# Patient Record
Sex: MALE | Race: ASIAN | NOT HISPANIC OR LATINO | Employment: FULL TIME | URBAN - METROPOLITAN AREA
[De-identification: names, ages, dates, MRNs, and addresses within clinical notes are randomized per-mention and may not be internally consistent; named-entity substitution may affect disease eponyms.]

---

## 2023-08-25 ENCOUNTER — OFFICE VISIT (OUTPATIENT)
Dept: PRIMARY CARE | Facility: CLINIC | Age: 63
End: 2023-08-25
Payer: COMMERCIAL

## 2023-08-25 ENCOUNTER — LAB (OUTPATIENT)
Dept: LAB | Facility: LAB | Age: 63
End: 2023-08-25
Payer: COMMERCIAL

## 2023-08-25 VITALS
HEIGHT: 62 IN | DIASTOLIC BLOOD PRESSURE: 68 MMHG | OXYGEN SATURATION: 99 % | HEART RATE: 59 BPM | SYSTOLIC BLOOD PRESSURE: 108 MMHG | BODY MASS INDEX: 24.22 KG/M2 | WEIGHT: 131.6 LBS | TEMPERATURE: 97.6 F

## 2023-08-25 DIAGNOSIS — R97.20 INCREASED PROSTATE SPECIFIC ANTIGEN (PSA) VELOCITY: Primary | ICD-10-CM

## 2023-08-25 DIAGNOSIS — Z12.11 ENCOUNTER FOR SCREENING FOR MALIGNANT NEOPLASM OF COLON: ICD-10-CM

## 2023-08-25 DIAGNOSIS — Z00.00 ROUTINE GENERAL MEDICAL EXAMINATION AT A HEALTH CARE FACILITY: ICD-10-CM

## 2023-08-25 DIAGNOSIS — Z00.00 ROUTINE GENERAL MEDICAL EXAMINATION AT A HEALTH CARE FACILITY: Primary | ICD-10-CM

## 2023-08-25 LAB
ALANINE AMINOTRANSFERASE (SGPT) (U/L) IN SER/PLAS: 25 U/L (ref 10–52)
ANION GAP IN SER/PLAS: 9 MMOL/L (ref 10–20)
CALCIUM (MG/DL) IN SER/PLAS: 9.5 MG/DL (ref 8.6–10.3)
CARBON DIOXIDE, TOTAL (MMOL/L) IN SER/PLAS: 30 MMOL/L (ref 21–32)
CHLORIDE (MMOL/L) IN SER/PLAS: 107 MMOL/L (ref 98–107)
CHOLESTEROL (MG/DL) IN SER/PLAS: 115 MG/DL (ref 0–199)
CHOLESTEROL IN HDL (MG/DL) IN SER/PLAS: 32.7 MG/DL
CHOLESTEROL/HDL RATIO: 3.5
CREATININE (MG/DL) IN SER/PLAS: 0.9 MG/DL (ref 0.5–1.3)
ERYTHROCYTE DISTRIBUTION WIDTH (RATIO) BY AUTOMATED COUNT: 13.4 % (ref 11.5–14.5)
ERYTHROCYTE MEAN CORPUSCULAR HEMOGLOBIN CONCENTRATION (G/DL) BY AUTOMATED: 33.5 G/DL (ref 32–36)
ERYTHROCYTE MEAN CORPUSCULAR VOLUME (FL) BY AUTOMATED COUNT: 86 FL (ref 80–100)
ERYTHROCYTES (10*6/UL) IN BLOOD BY AUTOMATED COUNT: 5.16 X10E12/L (ref 4.5–5.9)
GFR MALE: >90 ML/MIN/1.73M2
GLUCOSE (MG/DL) IN SER/PLAS: 82 MG/DL (ref 74–99)
HEMATOCRIT (%) IN BLOOD BY AUTOMATED COUNT: 44.2 % (ref 41–52)
HEMOGLOBIN (G/DL) IN BLOOD: 14.8 G/DL (ref 13.5–17.5)
LDL: 63 MG/DL (ref 0–99)
LEUKOCYTES (10*3/UL) IN BLOOD BY AUTOMATED COUNT: 5.5 X10E9/L (ref 4.4–11.3)
PLATELETS (10*3/UL) IN BLOOD AUTOMATED COUNT: 257 X10E9/L (ref 150–450)
POTASSIUM (MMOL/L) IN SER/PLAS: 4.6 MMOL/L (ref 3.5–5.3)
PROSTATE SPECIFIC AG (NG/ML) IN SER/PLAS: 2.99 NG/ML (ref 0–4)
SODIUM (MMOL/L) IN SER/PLAS: 141 MMOL/L (ref 136–145)
TRIGLYCERIDE (MG/DL) IN SER/PLAS: 98 MG/DL (ref 0–149)
UREA NITROGEN (MG/DL) IN SER/PLAS: 15 MG/DL (ref 6–23)
VLDL: 20 MG/DL (ref 0–40)

## 2023-08-25 PROCEDURE — 83036 HEMOGLOBIN GLYCOSYLATED A1C: CPT

## 2023-08-25 PROCEDURE — 90715 TDAP VACCINE 7 YRS/> IM: CPT | Performed by: INTERNAL MEDICINE

## 2023-08-25 PROCEDURE — 84460 ALANINE AMINO (ALT) (SGPT): CPT

## 2023-08-25 PROCEDURE — 80061 LIPID PANEL: CPT

## 2023-08-25 PROCEDURE — 99396 PREV VISIT EST AGE 40-64: CPT | Performed by: INTERNAL MEDICINE

## 2023-08-25 PROCEDURE — 1036F TOBACCO NON-USER: CPT | Performed by: INTERNAL MEDICINE

## 2023-08-25 PROCEDURE — 84153 ASSAY OF PSA TOTAL: CPT

## 2023-08-25 PROCEDURE — 85027 COMPLETE CBC AUTOMATED: CPT

## 2023-08-25 PROCEDURE — 36415 COLL VENOUS BLD VENIPUNCTURE: CPT

## 2023-08-25 PROCEDURE — 90471 IMMUNIZATION ADMIN: CPT | Performed by: INTERNAL MEDICINE

## 2023-08-25 PROCEDURE — 80048 BASIC METABOLIC PNL TOTAL CA: CPT

## 2023-08-25 RX ORDER — ATORVASTATIN CALCIUM 40 MG/1
TABLET, FILM COATED ORAL
COMMUNITY
End: 2023-10-02

## 2023-08-25 RX ORDER — VIT C/E/ZN/COPPR/LUTEIN/ZEAXAN 250MG-90MG
1 CAPSULE ORAL DAILY
COMMUNITY

## 2023-08-25 RX ORDER — METOPROLOL SUCCINATE 50 MG/1
25 TABLET, EXTENDED RELEASE ORAL DAILY
COMMUNITY
End: 2023-10-05

## 2023-08-25 RX ORDER — ASPIRIN 81 MG/1
1 TABLET ORAL DAILY
COMMUNITY

## 2023-08-25 RX ORDER — VALSARTAN 40 MG/1
20 TABLET ORAL DAILY
COMMUNITY
End: 2023-09-18

## 2023-08-25 ASSESSMENT — PATIENT HEALTH QUESTIONNAIRE - PHQ9
SUM OF ALL RESPONSES TO PHQ9 QUESTIONS 1 AND 2: 0
1. LITTLE INTEREST OR PLEASURE IN DOING THINGS: NOT AT ALL
2. FEELING DOWN, DEPRESSED OR HOPELESS: NOT AT ALL

## 2023-08-25 ASSESSMENT — ENCOUNTER SYMPTOMS
OCCASIONAL FEELINGS OF UNSTEADINESS: 0
LOSS OF SENSATION IN FEET: 0
DEPRESSION: 0

## 2023-08-25 NOTE — PROGRESS NOTES
"SUBJECTIVE:   Gui Bell is a 62 y.o. male presenting for his annual checkup.  Current Outpatient Medications   Medication Sig Dispense Refill    aspirin 81 mg EC tablet Take 1 tablet (81 mg) by mouth once daily.      atorvastatin (Lipitor) 40 mg tablet TAKE 1 TABLET BY MOUTH EVERY DAY. NEEDS APPT FOR RFS      cholecalciferol (Vitamin D-3) 25 MCG (1000 UT) capsule Take 1 capsule (25 mcg) by mouth once daily.      metoprolol succinate XL (Toprol-XL) 50 mg 24 hr tablet Take 0.5 tablets (25 mg) by mouth once daily.      valsartan (Diovan) 40 mg tablet Take 0.5 tablets (20 mg) by mouth once daily.       No current facility-administered medications for this visit.     Allergies: Lisinopril     ROS:  Feeling well. No dyspnea or chest pain on exertion. No abdominal pain, change in bowel habits, black or bloody stools. No urinary tract or prostatic symptoms. No neurological complaints.    OBJECTIVE:   The patient appears well, alert, oriented x 3, in no distress.   /68 (BP Location: Left arm, Patient Position: Sitting, BP Cuff Size: Adult)   Pulse 59   Temp 36.4 °C (97.6 °F) (Skin)   Ht 1.562 m (5' 1.5\")   Wt 59.7 kg (131 lb 9.6 oz)   SpO2 99%   BMI 24.46 kg/m²   ENT normal.  Neck supple. No adenopathy or thyromegaly. NENO. Lungs are clear, good air entry, no wheezes, rhonchi or rales. S1 and S2 normal, no murmurs, regular rate and rhythm. Abdomen is soft without tenderness, guarding, mass or organomegaly.  exam: no penile lesions or discharge, no testicular masses or tenderness, no hernias.  Extremities show no edema, normal peripheral pulses. Neurological is normal without focal findings.    ASSESSMENT:   healthy adult male    PLAN:   continue current healthy lifestyle patterns and return for routine annual checkups   #1 CAD-clinically stable. Follow-up with cardiology, we will consult given cardiologist per pt request  #2 history of colon polypsâ€“overdue for colonoscopy. Order entered. Stressed " importance  #3 lipids- good LDL, HDL remains low. will d/w cards.   #4 h/o increased ast/alt- normal on last several test. Continue to follow  #5 h/o chronic pancreatitis-status post extensive workup with gastroenterologyâ€“2013. No further follow-up recommended. No signs or symptoms  #6 vit D def- stable  #7 question of mild cognitive impairment- stable. follow.      rec Shingrix vaccine  rec COVID booster, flu vaccine in the fall  TDAP today--> reviewed

## 2023-08-26 LAB
ESTIMATED AVERAGE GLUCOSE FOR HBA1C: 114 MG/DL
HEMOGLOBIN A1C/HEMOGLOBIN TOTAL IN BLOOD: 5.6 %

## 2023-09-17 DIAGNOSIS — I10 ESSENTIAL (PRIMARY) HYPERTENSION: ICD-10-CM

## 2023-09-18 RX ORDER — VALSARTAN 40 MG/1
20 TABLET ORAL DAILY
Qty: 45 TABLET | Refills: 3 | Status: SHIPPED | OUTPATIENT
Start: 2023-09-18

## 2023-09-30 DIAGNOSIS — E78.5 HYPERLIPIDEMIA, UNSPECIFIED: ICD-10-CM

## 2023-10-02 RX ORDER — ATORVASTATIN CALCIUM 40 MG/1
TABLET, FILM COATED ORAL
Qty: 90 TABLET | Refills: 3 | Status: SHIPPED | OUTPATIENT
Start: 2023-10-02

## 2024-03-29 ENCOUNTER — APPOINTMENT (OUTPATIENT)
Dept: PRIMARY CARE | Facility: CLINIC | Age: 64
End: 2024-03-29
Payer: COMMERCIAL

## 2024-08-05 ENCOUNTER — APPOINTMENT (OUTPATIENT)
Dept: PRIMARY CARE | Facility: CLINIC | Age: 64
End: 2024-08-05
Payer: COMMERCIAL

## 2024-08-05 ENCOUNTER — LAB (OUTPATIENT)
Dept: LAB | Facility: LAB | Age: 64
End: 2024-08-05
Payer: COMMERCIAL

## 2024-08-05 VITALS
WEIGHT: 132.6 LBS | BODY MASS INDEX: 24.65 KG/M2 | TEMPERATURE: 97.4 F | SYSTOLIC BLOOD PRESSURE: 118 MMHG | DIASTOLIC BLOOD PRESSURE: 72 MMHG

## 2024-08-05 DIAGNOSIS — I10 PRIMARY HYPERTENSION: ICD-10-CM

## 2024-08-05 DIAGNOSIS — E78.5 DYSLIPIDEMIA: ICD-10-CM

## 2024-08-05 DIAGNOSIS — Z00.00 WELL ADULT EXAM: ICD-10-CM

## 2024-08-05 DIAGNOSIS — H91.20 SUDDEN-ONSET SENSORINEURAL HEARING LOSS: Primary | ICD-10-CM

## 2024-08-05 DIAGNOSIS — I50.42 CHRONIC COMBINED SYSTOLIC AND DIASTOLIC HEART FAILURE (MULTI): ICD-10-CM

## 2024-08-05 DIAGNOSIS — R97.20 INCREASED PROSTATE SPECIFIC ANTIGEN (PSA) VELOCITY: ICD-10-CM

## 2024-08-05 DIAGNOSIS — I25.10 ARTERIOSCLEROTIC HEART DISEASE (ASHD): ICD-10-CM

## 2024-08-05 PROBLEM — I25.5 CARDIOMYOPATHY, ISCHEMIC: Status: ACTIVE | Noted: 2024-08-05

## 2024-08-05 PROBLEM — I25.2 HISTORY OF MYOCARDIAL INFARCTION: Status: ACTIVE | Noted: 2024-08-05

## 2024-08-05 LAB
ALT SERPL W P-5'-P-CCNC: 19 U/L (ref 10–52)
ANION GAP SERPL CALC-SCNC: 10 MMOL/L (ref 10–20)
BUN SERPL-MCNC: 18 MG/DL (ref 6–23)
CALCIUM SERPL-MCNC: 9.3 MG/DL (ref 8.6–10.3)
CHLORIDE SERPL-SCNC: 106 MMOL/L (ref 98–107)
CHOLEST SERPL-MCNC: 124 MG/DL (ref 0–199)
CHOLESTEROL/HDL RATIO: 3.3
CO2 SERPL-SCNC: 29 MMOL/L (ref 21–32)
CREAT SERPL-MCNC: 0.89 MG/DL (ref 0.5–1.3)
EGFRCR SERPLBLD CKD-EPI 2021: >90 ML/MIN/1.73M*2
ERYTHROCYTE [DISTWIDTH] IN BLOOD BY AUTOMATED COUNT: 13.6 % (ref 11.5–14.5)
EST. AVERAGE GLUCOSE BLD GHB EST-MCNC: 120 MG/DL
GLUCOSE SERPL-MCNC: 86 MG/DL (ref 74–99)
HBA1C MFR BLD: 5.8 %
HCT VFR BLD AUTO: 44.8 % (ref 41–52)
HDLC SERPL-MCNC: 37.6 MG/DL
HGB BLD-MCNC: 14.8 G/DL (ref 13.5–17.5)
LDLC SERPL CALC-MCNC: 73 MG/DL
MCH RBC QN AUTO: 28.7 PG (ref 26–34)
MCHC RBC AUTO-ENTMCNC: 33 G/DL (ref 32–36)
MCV RBC AUTO: 87 FL (ref 80–100)
NON HDL CHOLESTEROL: 86 MG/DL (ref 0–149)
NRBC BLD-RTO: 0 /100 WBCS (ref 0–0)
PLATELET # BLD AUTO: 263 X10*3/UL (ref 150–450)
POTASSIUM SERPL-SCNC: 4.5 MMOL/L (ref 3.5–5.3)
PSA SERPL-MCNC: 3.25 NG/ML
RBC # BLD AUTO: 5.16 X10*6/UL (ref 4.5–5.9)
SODIUM SERPL-SCNC: 140 MMOL/L (ref 136–145)
TRIGL SERPL-MCNC: 69 MG/DL (ref 0–149)
VLDL: 14 MG/DL (ref 0–40)
WBC # BLD AUTO: 5.5 X10*3/UL (ref 4.4–11.3)

## 2024-08-05 PROCEDURE — 83036 HEMOGLOBIN GLYCOSYLATED A1C: CPT

## 2024-08-05 PROCEDURE — 80061 LIPID PANEL: CPT

## 2024-08-05 PROCEDURE — 84153 ASSAY OF PSA TOTAL: CPT

## 2024-08-05 PROCEDURE — 80048 BASIC METABOLIC PNL TOTAL CA: CPT

## 2024-08-05 PROCEDURE — 1036F TOBACCO NON-USER: CPT | Performed by: INTERNAL MEDICINE

## 2024-08-05 PROCEDURE — 99214 OFFICE O/P EST MOD 30 MIN: CPT | Performed by: INTERNAL MEDICINE

## 2024-08-05 PROCEDURE — 3074F SYST BP LT 130 MM HG: CPT | Performed by: INTERNAL MEDICINE

## 2024-08-05 PROCEDURE — 3078F DIAST BP <80 MM HG: CPT | Performed by: INTERNAL MEDICINE

## 2024-08-05 PROCEDURE — 85027 COMPLETE CBC AUTOMATED: CPT

## 2024-08-05 PROCEDURE — 36415 COLL VENOUS BLD VENIPUNCTURE: CPT

## 2024-08-05 PROCEDURE — 84460 ALANINE AMINO (ALT) (SGPT): CPT

## 2024-08-05 RX ORDER — LANOLIN ALCOHOL/MO/W.PET/CERES
1000 CREAM (GRAM) TOPICAL DAILY
COMMUNITY

## 2024-08-05 RX ORDER — SAME BUTANEDISULFONATE/BETAINE 400-600 MG
POWDER IN PACKET (EA) ORAL DAILY
COMMUNITY

## 2024-08-05 RX ORDER — GLUCOSAM/CHONDRO/HERB 149/HYAL 750-100 MG
TABLET ORAL
COMMUNITY

## 2024-08-05 RX ORDER — PREDNISONE 20 MG/1
60 TABLET ORAL DAILY
Qty: 18 TABLET | Refills: 0 | Status: SHIPPED | OUTPATIENT
Start: 2024-08-05 | End: 2024-08-11

## 2024-08-05 RX ORDER — CALCITONIN SALMON 200 [IU]/.09ML
1 SPRAY, METERED NASAL DAILY
COMMUNITY

## 2024-08-05 NOTE — PROGRESS NOTES
Subjective   Patient ID: Gui Bell is a 63 y.o. male who presents for check left ear, muffled, off balance, humming noise x 10 dya and Numbness (In hands x while).    HPI     Left ear fullness, muffled hearing.  No pain.  No ST, congestion. O/w no upper resp c/o    Bl hand numbness.  Episodic.  Better w/ changing position.  Increased w/ bending arms,.  No weakness.  X ~1 year  Review of Systems    Objective   /72   Temp 36.3 °C (97.4 °F)   Wt 60.1 kg (132 lb 9.6 oz)   BMI 24.65 kg/m²     Physical Exam      Lab Results   Component Value Date    WBC 5.5 08/25/2023    HGB 14.8 08/25/2023    HCT 44.2 08/25/2023     08/25/2023    CHOL 115 08/25/2023    TRIG 98 08/25/2023    HDL 32.7 (A) 08/25/2023    ALT 25 08/25/2023    AST 25 08/05/2022     08/25/2023    K 4.6 08/25/2023     08/25/2023    CREATININE 0.90 08/25/2023    BUN 15 08/25/2023    CO2 30 08/25/2023    TSH 3.87 08/05/2022    PSA 2.99 08/25/2023    HGBA1C 5.6 08/25/2023       Assessment/Plan         #1 CAD-clinically stable. Follow-up with cardiology, we will consult given cardiologist per pt request  #2 history of colon polyps-remains overdue for colonoscopy.  Spent time reviewing importance of follow-up.  He voiced understanding but at this point declines.  Does have information to make appointment.  Reviewed risk of polyps and cancer.    #3 lipids- labs  #4 h/o increased ast/alt- normal on last several test. Continue to follow  #5 h/o chronic pancreatitis-status post extensive workup with gastroenterology- 2013. No further follow-up recommended. No signs or symptoms  #6 vit D def- stable  #7 question of mild cognitive impairment- stable. follow.   #8?  Sudden sensorineural hearing loss.  Reviewed.  Normal exam.  Prednisone.  Reviewed risk.  Consult ENT.  #9?  CTS-reviewed.  Normal exam.  No loss of strength.  Patient declines further evaluation at this point.  Follow-up.     rec Shingrix vaccine  rec COVID booster, flu  vaccine in the fall  TDAP today--> reviewed

## 2024-08-08 ENCOUNTER — CLINICAL SUPPORT (OUTPATIENT)
Dept: AUDIOLOGY | Facility: CLINIC | Age: 64
End: 2024-08-08
Payer: COMMERCIAL

## 2024-08-08 ENCOUNTER — OFFICE VISIT (OUTPATIENT)
Dept: OTOLARYNGOLOGY | Facility: CLINIC | Age: 64
End: 2024-08-08
Payer: COMMERCIAL

## 2024-08-08 VITALS — TEMPERATURE: 98.6 F | BODY MASS INDEX: 24.91 KG/M2 | WEIGHT: 134 LBS

## 2024-08-08 DIAGNOSIS — H91.20 SUDDEN-ONSET SENSORINEURAL HEARING LOSS: ICD-10-CM

## 2024-08-08 DIAGNOSIS — H90.3 ASYMMETRICAL SENSORINEURAL HEARING LOSS: Primary | ICD-10-CM

## 2024-08-08 PROCEDURE — 1036F TOBACCO NON-USER: CPT | Performed by: OTOLARYNGOLOGY

## 2024-08-08 PROCEDURE — 92557 COMPREHENSIVE HEARING TEST: CPT | Performed by: AUDIOLOGIST

## 2024-08-08 PROCEDURE — 69801 INCISE INNER EAR: CPT | Performed by: OTOLARYNGOLOGY

## 2024-08-08 PROCEDURE — 92567 TYMPANOMETRY: CPT | Performed by: AUDIOLOGIST

## 2024-08-08 PROCEDURE — 99204 OFFICE O/P NEW MOD 45 MIN: CPT | Performed by: OTOLARYNGOLOGY

## 2024-08-08 ASSESSMENT — PATIENT HEALTH QUESTIONNAIRE - PHQ9
SUM OF ALL RESPONSES TO PHQ9 QUESTIONS 1 AND 2: 0
SUM OF ALL RESPONSES TO PHQ9 QUESTIONS 1 AND 2: 0
2. FEELING DOWN, DEPRESSED OR HOPELESS: NOT AT ALL
1. LITTLE INTEREST OR PLEASURE IN DOING THINGS: NOT AT ALL
2. FEELING DOWN, DEPRESSED OR HOPELESS: NOT AT ALL
1. LITTLE INTEREST OR PLEASURE IN DOING THINGS: NOT AT ALL

## 2024-08-08 NOTE — PROGRESS NOTES
"AUDIOMETRIC EVALUATION       Name:  Gui Bell  :  1960  Age:  63 y.o.  Date of Evaluation:  2024     HISTORY  Gui Bell was seen today for a hearing evaluation due to sudden hearing loss in the left ear. Symptoms began with imbalance 10 days ago followed but hearing loss in the left ear. Now balance has improved and has a constant hissing in the left ear.  He began oral steroids about 3 days ago after consulting Dr. Benavides.    Denies aural pain, drainage, fullness, history of familial hearing loss or noise exposure.    PROCEDURE:  Otoscopic Evaluation:    RIGHT: Clear ear canal and tympanic membrane visualized.  LEFT:  Clear ear canal and tympanic membrane visualized.    Immittance: Tympanometry (226 Hz probe tone) and Stapedial Acoustic Reflexes Thresholds (ART)(Probe ear):  RIGHT: Normal middle ear pressure, mobility, and ear canal volume. Ipsilateral ART present 500-2000 Hz.  LEFT: Normal middle ear pressure, mobility, and ear canal volume. Ipsilateral ART absent 500-2000 Hz.    Pure Tone and Speech Audiometry:    Test Technique: Pure Tone Audiometry via insert earphones  Test Reliability: good    RIGHT: Normal hearing through 2000 Hz sloping to moderate  sensorineural hearing loss through 8000 Hz. Word Recognition score was excellent using recorded material (NU-6 10-word list ordered by difficulty).   LEFT: Normal hearing through 250 Hz sloping to profound  sensorineural hearing loss through 8000 Hz. Word Recognition score was no word recognition at equipment limits using recorded material (NU-6 25-word list).     EVALUATION  See scanned Audiogram in \"Media\".    IMPRESSIONS:  Today's test results indicate normal middle ear function, bilaterally. Essentially profound  sensorineural hearing loss in the left ear - Dr. Brown (ENT) has agreed see the patient as an add on today rather than patient needing to wait till next weeks appointment.    RECOMMENDATIONS:  Continue medical follow-up " with physician.  Return for audiologic assessment in conjunction with otologic care.   Communication strategies were discussed (utilizing visual cues/gestures; reducing background noise and distance from desired source; increasing light to assist with visual cues; use of clear speech).    PATIENT EDUCATION:   Discussed results and recommendations with Gui Bell.  Questions were addressed and the patient was encouraged to contact our department ([phone]) should concerns arise.    ANDIE Tate, CCC-A  Senior Clinical Audiologist    TIME: [phone]

## 2024-08-08 NOTE — PROGRESS NOTES
History of present illness:  Gui Bell is a 63 y.o. male presenting today for E/M of sudden left-sided hearing loss.  He is referred by Dr. Latonia Benavides.    The patient states that ten-days ago he was playing sports and noticed that he felt off balance. Since then he has noticed significantly less hearing on the left side. He was placed on a regimen of Prednisone 60 mg per day.     The patient's current medications, active allergies and list of medical problems were reviewed in the EHR and confirmed electronically there are as follows;    Allergies:   Allergies   Allergen Reactions    Lisinopril Rash     Problem list:  Patient Active Problem List   Diagnosis    Chronic combined systolic and diastolic heart failure (Multi)    Arteriosclerotic heart disease (ASHD)    Cardiomyopathy, ischemic    Dyslipidemia    History of myocardial infarction    Hypertension     Current list of medications:   Current Outpatient Medications   Medication Sig Dispense Refill    aspirin 81 mg EC tablet Take 1 tablet (81 mg) by mouth once daily.      atorvastatin (Lipitor) 40 mg tablet TAKE 1 TABLET BY MOUTH EVERY DAY. NEEDS APPT FOR RFS 90 tablet 3    calcitonin, salmon, (Miacalcin) 200 unit/actuation nasal spray Administer 1 spray into one nostril once daily.      cholecalciferol (Vitamin D-3) 25 MCG (1000 UT) capsule Take 1 capsule (25 mcg) by mouth once daily.      cholecalciferol, vitD3,/vit K2 (vitamin D3-vitamin K2) 125-90 mcg capsule Take by mouth early in the morning..      cyanocobalamin (Vitamin B-12) 1,000 mcg tablet Take 1 tablet (1,000 mcg) by mouth once daily.      dihydroberberine (BERBERINE ES-5 ORAL) Take 550 mg by mouth early in the morning..      metoprolol succinate XL (Toprol-XL) 50 mg 24 hr tablet TAKE 1/2 TABLET BY MOUTH EVERY DAY 45 tablet 3    omega 3-dha-epa-fish oil (Fish OiL) 1,000 mg (120 mg-180 mg) capsule Take by mouth.      omega-3 fatty acids/dha/epa (OVEGA-3 ORAL) Take 720 mg by mouth once daily.       predniSONE (Deltasone) 20 mg tablet Take 3 tablets (60 mg) by mouth once daily for 6 days. 18 tablet 0    valsartan (Diovan) 40 mg tablet TAKE 1/2 TABLET BY MOUTH EVERY DAY 45 tablet 3     No current facility-administered medications for this visit.     Medical history:  Past Medical History:   Diagnosis Date    Loose body in knee, unspecified knee 2021    Loose body in knee    Old myocardial infarction 2013    Past myocardial infarction    Pain in left knee 2021    Left knee pain    Pain in right knee 2021    Right knee pain    Pain in unspecified knee 2021    Chronic knee pain, unspecified laterality     Surgical history:  Past Surgical History:   Procedure Laterality Date    OTHER SURGICAL HISTORY  2021    Tonsillectomy    OTHER SURGICAL HISTORY  2021    Cardiac catheterization with stent placement     Family history:  Family History   Problem Relation Name Age of Onset    Hypertension Mother      Coronary artery disease Father      Other (cabg) Father      Heart attack Father      Other (acute myeloid leukemia) Brother       Social history:  Social History     Tobacco Use    Smoking status: Former     Current packs/day: 0.00     Average packs/day: 0.3 packs/day for 10.0 years (2.5 ttl pk-yrs)     Types: Cigarettes     Start date:      Quit date:      Years since quittin.6     Passive exposure: Past    Smokeless tobacco: Never   Substance Use Topics    Alcohol use: Yes     Comment: rare    Drug use: Never       Physical Examination:  CONSTITUTIONAL:  No acute distress  VOICE:  No hoarseness or other abnormality  RESPIRATION:  Breathing comfortably, no stridor  CV:  No clubbing/cyanosis/edema in hands  EYES:  EOM intact, sclera clear  NEURO:  Alert and oriented times 3, Cranial nerves II-XII grossly intact and symmetric bilaterally  HEAD AND FACE:  Symmetric facial features, no masses or lesions  RIGHT EAR:  Normal external ear and post auricular area, no  visible lesions, external auditory canal patent, tympanic membrane intact, no retraction, no signs of mass, effusion, or infection within the middle ear  LEFT EAR: Normal external ear and post auricular area, no visible lesions, external auditory canal patent, tympanic membrane intact, no retraction, no signs of mass, effusion, or infection within the middle ear  NOSE:  Anterior rhinoscopy clear, no significant external deformity.  ORAL CAVITY/OROPHARYNX/LIPS:  normal lining, mobile tongue.  PHARYNGEAL WALLS: Moist mucosal lining, good palatal elevation  NECK/LYMPH:  No LAD, no thyroid masses, trachea midline  SKIN:  Neck and facial skin is without scar or injury  PSYCH:  Alert and oriented with appropriate mood and affect    Procedure note:  Date: 8/8/2024    The indications and risks of intratympanic steroid injection were discussed.  The patient was positioned, an appropriately sized ear speculum was introduced and the binocular microscope was brought into the field. After application of phenol, dexamethasone was injected into the middle ear. Approximately 0.5 mL was injected. The patient was then left to rest in a supine position for 15 minutes. The procedure was well tolerated, there were no complications.       Diagnostic testing:  Audiometry:  Audiometry testing done reveals:  On the right: Normal sloping to moderate high frequency sensorineural hearing loss. Speech discrimination 100%.  On the left: Normal sloping to profound sensorineural hearing loss. Speech discrimination 0%.    I personally reviewed the available patient's external record and independently reviewed their audiometric testing [ through the appropriate viewing software as detailed in my note and agree with the detailed report.     Impression:  Asymmetric hearing loss  Sudden left-sided sensorineural hearing      Recommendation:  We will go ahead and order a MRI of the IAC with and without contrast to rule out retrocochlear pathology.  I did  ask him to keep his appointment with Dr. Montiel for possibly another injection next week.  Will see him back in few weeks with repeat audiometric testing.    I discussed with the patient the complexity of my medical decision making including the treatment and testing rational, indications of their elective procedure and possible adverse effects and/or complications. Based on the provided documentation and my professional assessment of this patient's new diagnosis, the complexity of evaluation and treatment is moderate.    This note was created using speech recognition transcription software. Despite proofreading, several typographical errors might be present that might affect the meaning of the content. Please call with any questions.    Scribe Attestation  By signing my name below, I, Gricel Orlando , Scrnelsy   attest that this documentation has been prepared under the direction and in the presence of Fernando Brown MD.

## 2024-08-16 ENCOUNTER — APPOINTMENT (OUTPATIENT)
Dept: OTOLARYNGOLOGY | Facility: CLINIC | Age: 64
End: 2024-08-16
Payer: COMMERCIAL

## 2024-08-21 ENCOUNTER — HOSPITAL ENCOUNTER (OUTPATIENT)
Dept: RADIOLOGY | Facility: CLINIC | Age: 64
Discharge: HOME | End: 2024-08-21
Payer: COMMERCIAL

## 2024-08-21 DIAGNOSIS — H91.20 SUDDEN-ONSET SENSORINEURAL HEARING LOSS: ICD-10-CM

## 2024-08-21 PROCEDURE — 70553 MRI BRAIN STEM W/O & W/DYE: CPT

## 2024-08-21 PROCEDURE — A9575 INJ GADOTERATE MEGLUMI 0.1ML: HCPCS | Performed by: OTOLARYNGOLOGY

## 2024-08-21 PROCEDURE — 2550000001 HC RX 255 CONTRASTS: Performed by: OTOLARYNGOLOGY

## 2024-08-21 PROCEDURE — 70553 MRI BRAIN STEM W/O & W/DYE: CPT | Performed by: RADIOLOGY

## 2024-08-21 RX ORDER — GADOTERATE MEGLUMINE 376.9 MG/ML
12 INJECTION INTRAVENOUS
Status: COMPLETED | OUTPATIENT
Start: 2024-08-21 | End: 2024-08-21

## 2024-08-22 ENCOUNTER — APPOINTMENT (OUTPATIENT)
Dept: OTOLARYNGOLOGY | Facility: CLINIC | Age: 64
End: 2024-08-22
Payer: COMMERCIAL

## 2024-08-22 ENCOUNTER — APPOINTMENT (OUTPATIENT)
Dept: RADIOLOGY | Facility: CLINIC | Age: 64
End: 2024-08-22
Payer: COMMERCIAL

## 2024-08-22 VITALS — HEIGHT: 62 IN | BODY MASS INDEX: 24.66 KG/M2 | WEIGHT: 134 LBS

## 2024-08-22 DIAGNOSIS — H93.12 TINNITUS OF LEFT EAR: ICD-10-CM

## 2024-08-22 DIAGNOSIS — H90.3 ASYMMETRICAL SENSORINEURAL HEARING LOSS: ICD-10-CM

## 2024-08-22 DIAGNOSIS — H91.20 SUDDEN-ONSET SENSORINEURAL HEARING LOSS: Primary | ICD-10-CM

## 2024-08-22 PROCEDURE — 69801 INCISE INNER EAR: CPT | Performed by: STUDENT IN AN ORGANIZED HEALTH CARE EDUCATION/TRAINING PROGRAM

## 2024-08-22 PROCEDURE — 99204 OFFICE O/P NEW MOD 45 MIN: CPT | Performed by: STUDENT IN AN ORGANIZED HEALTH CARE EDUCATION/TRAINING PROGRAM

## 2024-08-22 PROCEDURE — 1036F TOBACCO NON-USER: CPT | Performed by: STUDENT IN AN ORGANIZED HEALTH CARE EDUCATION/TRAINING PROGRAM

## 2024-08-22 PROCEDURE — 3008F BODY MASS INDEX DOCD: CPT | Performed by: STUDENT IN AN ORGANIZED HEALTH CARE EDUCATION/TRAINING PROGRAM

## 2024-08-22 NOTE — PROGRESS NOTES
PÉREZ Bell is a 63 y.o. male presenting for initial evaluation of sudden left hearing loss.  The patient reports developing left sudden hearing loss with associated dizziness on 7/28/24, he was evaluated by his PCP (Dr. Benavides) and prescribed systemic steroids (prednisone).  He was then evaluated with Dr. Brown who perform a left transtympanic dexamethasone injection.  The patient reports improvement but persistent left hearing loss and left high-frequency tinnitus which he describes as his hissing.  Dizziness/vertigo has resolved.  Denies ear pain, vertigo, discharge from the ears, aural fullness, autophony.  No history of previous ear surgeries.     Past Medical History:   Diagnosis Date    Ear problems     HL (hearing loss)     Loose body in knee, unspecified knee 04/30/2021    Loose body in knee    Old myocardial infarction 09/30/2013    Past myocardial infarction    Pain in left knee 04/12/2021    Left knee pain    Pain in right knee 04/12/2021    Right knee pain    Pain in unspecified knee 04/30/2021    Chronic knee pain, unspecified laterality    Sleep apnea        Past Surgical History:   Procedure Laterality Date    OTHER SURGICAL HISTORY  05/04/2021    Tonsillectomy    OTHER SURGICAL HISTORY  05/04/2021    Cardiac catheterization with stent placement         Current Outpatient Medications on File Prior to Visit   Medication Sig Dispense Refill    aspirin 81 mg EC tablet Take 1 tablet (81 mg) by mouth once daily.      atorvastatin (Lipitor) 40 mg tablet TAKE 1 TABLET BY MOUTH EVERY DAY. NEEDS APPT FOR RFS 90 tablet 3    cholecalciferol (Vitamin D-3) 25 MCG (1000 UT) capsule Take 1 capsule (25 mcg) by mouth once daily.      cholecalciferol, vitD3,/vit K2 (vitamin D3-vitamin K2) 125-90 mcg capsule Take by mouth early in the morning..      cyanocobalamin (Vitamin B-12) 1,000 mcg tablet Take 1 tablet (1,000 mcg) by mouth once daily.      dihydroberberine (BERBERINE ES-5 ORAL) Take 550 mg by mouth  early in the morning..      metoprolol succinate XL (Toprol-XL) 50 mg 24 hr tablet TAKE 1/2 TABLET BY MOUTH EVERY DAY 45 tablet 3    omega 3-dha-epa-fish oil (Fish OiL) 1,000 mg (120 mg-180 mg) capsule Take by mouth.      omega-3 fatty acids/dha/epa (OVEGA-3 ORAL) Take 720 mg by mouth once daily.      valsartan (Diovan) 40 mg tablet TAKE 1/2 TABLET BY MOUTH EVERY DAY 45 tablet 3    calcitonin, salmon, (Miacalcin) 200 unit/actuation nasal spray Administer 1 spray into one nostril once daily.       No current facility-administered medications on file prior to visit.        Allergies   Allergen Reactions    Lisinopril Rash        Review of Systems  A detailed 12 point ROS was performed and is negative except as noted in the intake form, HPI and/or Past Medical History        Physical Exam   CONSTITUTIONAL: Well developed, well nourished.  VOICE: Normal voice quality  RESPIRATION: Breathing comfortably, no stridor.  CV: No clubbing/cyanosis/edema in hands.  EYES: EOM Intact, sclera normal.  NEURO: Alert and oriented times 3, Cranial nerves V,VII intact and symmetric bilaterally.  HEAD AND FACE: Symmetric facial features, no masses or lesions, sinuses nontender to palpation.  SALIVARY GLANDS: Parotid and submandibular glands normal bilaterally.  EARS: Normal external ears, external auditory canals, and TMs to otoscopy  NOSE: External nose midline, anterior rhinoscopy is normal with limited visualization to the anterior aspect of the interior turbinates. No lesions noted.  ORAL CAVITY/OROPHARYNX/LIPS: Normal mucous membranes, normal floor of mouth/tongue/OP, no masses or lesions are noted.  PHARYNGEAL WALLS AND NASOPHARYNX: No masses noted. Mucosa appears clean and moist  NECK/LYMPH: No LAD, no thyroid masses. Trachea palpably midline  SKIN: Neck skin is without injury  PSYCH: Alert and oriented with appropriate mood and affect     Procedure:   Left transtympanic dexamethasone injection  Diagnosis/indication: Left sudden  sensorineural hearing loss  The procedure was reviewed and explained, multiple treatment alternatives, risks and benefits discussed.  Consent was obtained.  With the use of the microscope and speculum the left ear was examined, a transtympanic dexamethasone injection (10mg/ml) was performed with a 25-gauge spinal needle.  The patient tolerated the procedure well.     Results:   - MRI IAC 8/21/2024 personally reviewed showing no CPA/IAC lesions or masses.   - Audiogram 8/22/24 personally reviewed  Right: Normal hearing up to 3000 Hz sloping to moderate sensorineural hearing loss at 4000 Hz improving to mild sensorineural hearing loss at 8000 Hz.  Speech discrimination score 100%.  Type a tympanogram.  Left: Normal sloping to profound sensorineural hearing loss.  Speech discrimination score 0%.  Type a tympanogram.    Assessment  Left sudden sensorineural hearing loss  Asymmetric sensorineural hearing loss  Left tinnitus    Plan  63-year-old male presenting for evaluation of left sudden hearing loss s/p systemic steroids and middle ear dexamethasone injection (x1).  Reports slight improvement but persistent hearing loss, multiple treatment alternatives discussed, he elected to proceed with an additional left transtympanic dexamethasone injection which was performed today in clinic.  MRI IAC 8/21/2024: No CPA/IAC masses  RTC 1w

## 2024-08-23 ENCOUNTER — APPOINTMENT (OUTPATIENT)
Dept: OTOLARYNGOLOGY | Facility: CLINIC | Age: 64
End: 2024-08-23
Payer: COMMERCIAL

## 2024-08-26 ENCOUNTER — APPOINTMENT (OUTPATIENT)
Dept: PRIMARY CARE | Facility: CLINIC | Age: 64
End: 2024-08-26
Payer: COMMERCIAL

## 2024-08-26 VITALS
DIASTOLIC BLOOD PRESSURE: 64 MMHG | WEIGHT: 133 LBS | HEIGHT: 62 IN | SYSTOLIC BLOOD PRESSURE: 106 MMHG | BODY MASS INDEX: 24.48 KG/M2

## 2024-08-26 DIAGNOSIS — R73.01 IMPAIRED FASTING BLOOD SUGAR: ICD-10-CM

## 2024-08-26 DIAGNOSIS — Z12.11 ENCOUNTER FOR SCREENING FOR MALIGNANT NEOPLASM OF COLON: ICD-10-CM

## 2024-08-26 DIAGNOSIS — I63.9 CEREBROVASCULAR ACCIDENT (CVA), UNSPECIFIED MECHANISM (MULTI): ICD-10-CM

## 2024-08-26 DIAGNOSIS — Z00.00 REGULAR CHECK-UP: Primary | ICD-10-CM

## 2024-08-26 DIAGNOSIS — H91.20 SUDDEN-ONSET SENSORINEURAL HEARING LOSS: ICD-10-CM

## 2024-08-26 PROCEDURE — 99396 PREV VISIT EST AGE 40-64: CPT | Performed by: INTERNAL MEDICINE

## 2024-08-26 PROCEDURE — 3074F SYST BP LT 130 MM HG: CPT | Performed by: INTERNAL MEDICINE

## 2024-08-26 PROCEDURE — 3008F BODY MASS INDEX DOCD: CPT | Performed by: INTERNAL MEDICINE

## 2024-08-26 PROCEDURE — 3078F DIAST BP <80 MM HG: CPT | Performed by: INTERNAL MEDICINE

## 2024-08-26 ASSESSMENT — PATIENT HEALTH QUESTIONNAIRE - PHQ9
2. FEELING DOWN, DEPRESSED OR HOPELESS: NOT AT ALL
1. LITTLE INTEREST OR PLEASURE IN DOING THINGS: NOT AT ALL
SUM OF ALL RESPONSES TO PHQ9 QUESTIONS 1 AND 2: 0

## 2024-08-26 NOTE — PROGRESS NOTES
"SUBJECTIVE:   Gui Bell is a 63 y.o. male presenting for his annual checkup.  Current Outpatient Medications   Medication Sig Dispense Refill    aspirin 81 mg EC tablet Take 1 tablet (81 mg) by mouth once daily.      atorvastatin (Lipitor) 40 mg tablet TAKE 1 TABLET BY MOUTH EVERY DAY. NEEDS APPT FOR RFS 90 tablet 3    cholecalciferol (Vitamin D-3) 25 MCG (1000 UT) capsule Take 1 capsule (25 mcg) by mouth once daily.      cholecalciferol, vitD3,/vit K2 (vitamin D3-vitamin K2) 125-90 mcg capsule Take by mouth early in the morning..      cyanocobalamin (Vitamin B-12) 1,000 mcg tablet Take 1 tablet (1,000 mcg) by mouth once daily.      dihydroberberine (BERBERINE ES-5 ORAL) Take 550 mg by mouth early in the morning..      metoprolol succinate XL (Toprol-XL) 50 mg 24 hr tablet TAKE 1/2 TABLET BY MOUTH EVERY DAY 45 tablet 3    omega 3-dha-epa-fish oil (Fish OiL) 1,000 mg (120 mg-180 mg) capsule Take by mouth.      omega-3 fatty acids/dha/epa (OVEGA-3 ORAL) Take 720 mg by mouth once daily.      valsartan (Diovan) 40 mg tablet TAKE 1/2 TABLET BY MOUTH EVERY DAY 45 tablet 3    calcitonin, salmon, (Miacalcin) 200 unit/actuation nasal spray Administer 1 spray into one nostril once daily.       No current facility-administered medications for this visit.     Allergies: Lisinopril     ROS:  Feeling well. No dyspnea or chest pain on exertion. No abdominal pain, change in bowel habits, black or bloody stools. No urinary tract or prostatic symptoms. No neurological complaints.    OBJECTIVE:   The patient appears well, alert, oriented x 3, in no distress.   /64   Ht 1.562 m (5' 1.5\")   Wt 60.3 kg (133 lb)   BMI 24.72 kg/m²   ENT normal.  Neck supple. No adenopathy or thyromegaly. NENO. Lungs are clear, good air entry, no wheezes, rhonchi or rales. S1 and S2 normal, no murmurs, regular rate and rhythm. Abdomen is soft without tenderness, guarding, mass or organomegaly.   Extremities show no edema, normal " peripheral pulses. Neurological is normal without focal findings.      Lab Results   Component Value Date    WBC 5.5 08/05/2024    HGB 14.8 08/05/2024    HCT 44.8 08/05/2024     08/05/2024    CHOL 124 08/05/2024    TRIG 69 08/05/2024    HDL 37.6 08/05/2024    ALT 19 08/05/2024    AST 25 08/05/2022     08/05/2024    K 4.5 08/05/2024     08/05/2024    CREATININE 0.89 08/05/2024    BUN 18 08/05/2024    CO2 29 08/05/2024    TSH 3.87 08/05/2022    PSA 3.25 08/05/2024    HGBA1C 5.8 (H) 08/05/2024     === 08/21/24 ===    MR IAC WO AND W CONTRAST    - Impression -  1. There are 3 small old infarcts in the right cerebellum and minimal  scattered foci of T2 hyperintensity within the cerebral hemispheric  white matter which are nonspecific. Otherwise, unremarkable MRI of  the brain.    2. Unremarkable MRI of the internal auditory canals/inner ear  structures.    This study was interpreted at [facility].    MACRO:  None    Signed by: Scott Long 8/21/2024 3:28 PM  Dictation workstation:   [id number]    ASSESSMENT:   healthy adult male    PLAN:   return for routine annual checkups    #1 CAD-clinically stable. Follow-up with cardiology, reviewed importance of cardiology follow-up.  He will call and make appointment directly  #2 history of colon polyps-remains overdue for colonoscopy.  Spent time reviewing importance of follow-up.  He voiced understanding but at this point declines.  Does have information to make appointment.  Orders in EMR.  Reviewed risk of polyps and cancer.    #3 lipids-on target   #4 h/o increased ast/alt- normal on last several test. Continue to follow  #5 h/o chronic pancreatitis-status post extensive workup with gastroenterology- 2013. No further follow-up recommended. No signs or symptoms  #6 vit D def- stable  #7 question of mild cognitive impairment- stable. follow.   #8?  Sudden sensorineural hearing loss.  Reviewed.  Normal exam.   Follow-up  ENT.  #9?  CTS-reviewed.  Normal exam.  No loss of strength.  Patient declines further evaluation at this point.  Follow-up.  #10 stroke seen on MRI-remote.  Continue current treatment with aspirin/statin.  Consult neurology per patient request.     Recommend Shingrix vaccine  Recommend COVID booster.  Recommend flu vaccine in the fall.  rec COVID booster, flu vaccine in the fall  TDAP today--> reviewed

## 2024-08-30 ENCOUNTER — APPOINTMENT (OUTPATIENT)
Dept: OTOLARYNGOLOGY | Facility: CLINIC | Age: 64
End: 2024-08-30
Payer: COMMERCIAL

## 2024-08-30 VITALS — HEIGHT: 61 IN | WEIGHT: 134 LBS | BODY MASS INDEX: 25.3 KG/M2

## 2024-08-30 DIAGNOSIS — H93.12 TINNITUS OF LEFT EAR: ICD-10-CM

## 2024-08-30 DIAGNOSIS — H91.20 SUDDEN-ONSET SENSORINEURAL HEARING LOSS: Primary | ICD-10-CM

## 2024-08-30 DIAGNOSIS — H90.3 ASYMMETRICAL SENSORINEURAL HEARING LOSS: ICD-10-CM

## 2024-08-30 PROBLEM — R50.9 FEVER: Status: ACTIVE | Noted: 2024-08-30

## 2024-08-30 PROBLEM — R52 BODY ACHES: Status: ACTIVE | Noted: 2024-08-30

## 2024-08-30 PROBLEM — G47.33 OBSTRUCTIVE SLEEP APNEA: Status: ACTIVE | Noted: 2024-08-30

## 2024-08-30 PROBLEM — E55.9 VITAMIN D DEFICIENCY: Status: ACTIVE | Noted: 2024-08-30

## 2024-08-30 PROBLEM — L30.9 DERMATITIS: Status: ACTIVE | Noted: 2024-08-30

## 2024-08-30 PROBLEM — M17.0 PRIMARY OSTEOARTHRITIS OF BOTH KNEES: Status: ACTIVE | Noted: 2024-08-30

## 2024-08-30 PROBLEM — R97.20 ELEVATED PSA: Status: ACTIVE | Noted: 2024-08-30

## 2024-08-30 PROBLEM — H00.014 HORDEOLUM EXTERNUM OF LEFT UPPER EYELID: Status: ACTIVE | Noted: 2024-08-30

## 2024-08-30 PROBLEM — R74.01 ELEVATED TRANSAMINASE LEVEL: Status: ACTIVE | Noted: 2024-08-30

## 2024-08-30 PROCEDURE — 99213 OFFICE O/P EST LOW 20 MIN: CPT | Performed by: STUDENT IN AN ORGANIZED HEALTH CARE EDUCATION/TRAINING PROGRAM

## 2024-08-30 PROCEDURE — 69801 INCISE INNER EAR: CPT | Performed by: STUDENT IN AN ORGANIZED HEALTH CARE EDUCATION/TRAINING PROGRAM

## 2024-08-30 PROCEDURE — 3008F BODY MASS INDEX DOCD: CPT | Performed by: STUDENT IN AN ORGANIZED HEALTH CARE EDUCATION/TRAINING PROGRAM

## 2024-08-30 PROCEDURE — 1036F TOBACCO NON-USER: CPT | Performed by: STUDENT IN AN ORGANIZED HEALTH CARE EDUCATION/TRAINING PROGRAM

## 2024-08-30 NOTE — PROGRESS NOTES
HPI  8/30/24  The patient present for follow-up, reports improvement but not resolution of his left hearing loss.   Recall   Gui Bell is a 64 y.o. male presenting for initial evaluation of sudden left hearing loss.  The patient reports developing left sudden hearing loss with associated dizziness on 7/28/24, he was evaluated by his PCP (Dr. Benavides) and prescribed systemic steroids (prednisone).  He was then evaluated with Dr. Brown who perform a left transtympanic dexamethasone injection.  The patient reports improvement but persistent left hearing loss and left high-frequency tinnitus which he describes as his hissing.  Dizziness/vertigo has resolved.  Denies ear pain, vertigo, discharge from the ears, aural fullness, autophony.  No history of previous ear surgeries.     Past Medical History:   Diagnosis Date    Ear problems     HL (hearing loss)     Loose body in knee, unspecified knee 04/30/2021    Loose body in knee    Old myocardial infarction 09/30/2013    Past myocardial infarction    Pain in left knee 04/12/2021    Left knee pain    Pain in right knee 04/12/2021    Right knee pain    Pain in unspecified knee 04/30/2021    Chronic knee pain, unspecified laterality    Sleep apnea        Past Surgical History:   Procedure Laterality Date    OTHER SURGICAL HISTORY  05/04/2021    Tonsillectomy    OTHER SURGICAL HISTORY  05/04/2021    Cardiac catheterization with stent placement         Current Outpatient Medications on File Prior to Visit   Medication Sig Dispense Refill    aspirin 81 mg EC tablet Take 1 tablet (81 mg) by mouth once daily.      atorvastatin (Lipitor) 40 mg tablet TAKE 1 TABLET BY MOUTH EVERY DAY. NEEDS APPT FOR RFS 90 tablet 3    cholecalciferol (Vitamin D-3) 25 MCG (1000 UT) capsule Take 1 capsule (25 mcg) by mouth once daily.      cholecalciferol, vitD3,/vit K2 (vitamin D3-vitamin K2) 125-90 mcg capsule Take by mouth early in the morning..      cyanocobalamin (Vitamin B-12) 1,000 mcg  tablet Take 1 tablet (1,000 mcg) by mouth once daily.      dihydroberberine (BERBERINE ES-5 ORAL) Take 550 mg by mouth early in the morning..      metoprolol succinate XL (Toprol-XL) 50 mg 24 hr tablet TAKE 1/2 TABLET BY MOUTH EVERY DAY 45 tablet 3    valsartan (Diovan) 40 mg tablet TAKE 1/2 TABLET BY MOUTH EVERY DAY 45 tablet 3    [DISCONTINUED] calcitonin, salmon, (Miacalcin) 200 unit/actuation nasal spray Administer 1 spray into one nostril once daily.      [DISCONTINUED] omega 3-dha-epa-fish oil (Fish OiL) 1,000 mg (120 mg-180 mg) capsule Take by mouth.      [DISCONTINUED] omega-3 fatty acids/dha/epa (OVEGA-3 ORAL) Take 720 mg by mouth once daily.       No current facility-administered medications on file prior to visit.        Allergies   Allergen Reactions    Lisinopril Rash        Review of Systems  A detailed 12 point ROS was performed and is negative except as noted in the intake form, HPI and/or Past Medical History        Physical Exam   CONSTITUTIONAL: Well developed, well nourished.  VOICE: Normal voice quality  RESPIRATION: Breathing comfortably, no stridor.  CV: No clubbing/cyanosis/edema in hands.  EYES: EOM Intact, sclera normal.  NEURO: Alert and oriented times 3, Cranial nerves V,VII intact and symmetric bilaterally.  HEAD AND FACE: Symmetric facial features, no masses or lesions, sinuses nontender to palpation.  SALIVARY GLANDS: Parotid and submandibular glands normal bilaterally.  EARS: Normal external ears, external auditory canals, and TMs to otoscopy  NOSE: External nose midline, anterior rhinoscopy is normal with limited visualization to the anterior aspect of the interior turbinates. No lesions noted.  ORAL CAVITY/OROPHARYNX/LIPS: Normal mucous membranes, normal floor of mouth/tongue/OP, no masses or lesions are noted.  PHARYNGEAL WALLS AND NASOPHARYNX: No masses noted. Mucosa appears clean and moist  NECK/LYMPH: No LAD, no thyroid masses. Trachea palpably midline  SKIN: Neck skin is  without injury  PSYCH: Alert and oriented with appropriate mood and affect     Procedure:   Left transtympanic dexamethasone injection  Diagnosis/indication: Left sudden sensorineural hearing loss  The procedure was reviewed and explained, multiple treatment alternatives, risks and benefits discussed.  The patient elected to proceed with left transtympanic dexamethasone injection. Consent was obtained.  With the use of the microscope and speculum the left ear was examined, a transtympanic dexamethasone injection (10mg/ml) was performed with a 25-gauge spinal needle.  The patient tolerated the procedure well.     Results:   - MRI IAC 8/21/2024 personally reviewed showing no CPA/IAC lesions or masses.   - Audiogram 8/22/24 personally reviewed  Right: Normal hearing up to 3000 Hz sloping to moderate sensorineural hearing loss at 4000 Hz improving to mild sensorineural hearing loss at 8000 Hz.  Speech discrimination score 100%.  Type A tympanogram.  Left: Normal sloping to profound sensorineural hearing loss.  Speech discrimination score 0%.  Type A tympanogram.      Assessment  Left sudden sensorineural hearing loss  Asymmetric sensorineural hearing loss  Left tinnitus    Plan  63-year-old male presenting for evaluation of left sudden hearing loss s/p systemic steroids and middle ear dexamethasone injection (x2).  Reports improvement but persistent hearing loss, multiple treatment alternatives discussed, he elected to proceed with an additional left transtympanic dexamethasone injection which was performed today in clinic.  MRI IAC 8/21/2024: No CPA/IAC masses  We will proceed with repeat audiogram at follow-up  RTC 2m

## 2024-09-16 ENCOUNTER — APPOINTMENT (OUTPATIENT)
Dept: OTOLARYNGOLOGY | Facility: CLINIC | Age: 64
End: 2024-09-16
Payer: COMMERCIAL

## 2024-09-18 DIAGNOSIS — I25.10 ATHEROSCLEROTIC HEART DISEASE OF NATIVE CORONARY ARTERY WITHOUT ANGINA PECTORIS: ICD-10-CM

## 2024-09-18 DIAGNOSIS — I10 ESSENTIAL (PRIMARY) HYPERTENSION: ICD-10-CM

## 2024-09-18 RX ORDER — METOPROLOL SUCCINATE 50 MG/1
25 TABLET, EXTENDED RELEASE ORAL DAILY
Qty: 45 TABLET | Refills: 3 | Status: SHIPPED | OUTPATIENT
Start: 2024-09-18

## 2024-09-18 RX ORDER — VALSARTAN 40 MG/1
20 TABLET ORAL DAILY
Qty: 45 TABLET | Refills: 3 | Status: SHIPPED | OUTPATIENT
Start: 2024-09-18

## 2024-09-19 ENCOUNTER — LAB (OUTPATIENT)
Dept: LAB | Facility: LAB | Age: 64
End: 2024-09-19
Payer: COMMERCIAL

## 2024-09-19 DIAGNOSIS — H91.20 SUDDEN-ONSET SENSORINEURAL HEARING LOSS: ICD-10-CM

## 2024-09-19 LAB — VIT B12 SERPL-MCNC: 1458 PG/ML (ref 211–911)

## 2024-09-19 PROCEDURE — 36415 COLL VENOUS BLD VENIPUNCTURE: CPT

## 2024-09-19 PROCEDURE — 82607 VITAMIN B-12: CPT

## 2024-10-07 DIAGNOSIS — E78.5 HYPERLIPIDEMIA, UNSPECIFIED: ICD-10-CM

## 2024-10-07 RX ORDER — ATORVASTATIN CALCIUM 40 MG/1
40 TABLET, FILM COATED ORAL DAILY
Qty: 90 TABLET | Refills: 4 | Status: SHIPPED | OUTPATIENT
Start: 2024-10-07

## 2024-11-04 ENCOUNTER — APPOINTMENT (OUTPATIENT)
Dept: NEUROLOGY | Facility: CLINIC | Age: 64
End: 2024-11-04
Payer: COMMERCIAL

## 2024-11-12 ENCOUNTER — APPOINTMENT (OUTPATIENT)
Dept: AUDIOLOGY | Facility: CLINIC | Age: 64
End: 2024-11-12
Payer: COMMERCIAL

## 2024-11-12 ENCOUNTER — APPOINTMENT (OUTPATIENT)
Dept: OTOLARYNGOLOGY | Facility: CLINIC | Age: 64
End: 2024-11-12
Payer: COMMERCIAL

## 2024-11-13 ENCOUNTER — APPOINTMENT (OUTPATIENT)
Dept: CARDIOLOGY | Facility: CLINIC | Age: 64
End: 2024-11-13
Payer: COMMERCIAL

## 2024-12-16 ENCOUNTER — APPOINTMENT (OUTPATIENT)
Dept: OTOLARYNGOLOGY | Facility: CLINIC | Age: 64
End: 2024-12-16
Payer: COMMERCIAL

## 2024-12-16 ENCOUNTER — APPOINTMENT (OUTPATIENT)
Dept: AUDIOLOGY | Facility: CLINIC | Age: 64
End: 2024-12-16
Payer: COMMERCIAL

## 2024-12-19 ENCOUNTER — OFFICE VISIT (OUTPATIENT)
Dept: CARDIOLOGY | Facility: CLINIC | Age: 64
End: 2024-12-19
Payer: COMMERCIAL

## 2024-12-19 VITALS
DIASTOLIC BLOOD PRESSURE: 79 MMHG | HEIGHT: 62 IN | BODY MASS INDEX: 24.66 KG/M2 | HEART RATE: 50 BPM | SYSTOLIC BLOOD PRESSURE: 135 MMHG | OXYGEN SATURATION: 98 % | WEIGHT: 134 LBS

## 2024-12-19 DIAGNOSIS — I25.10 ARTERIOSCLEROTIC HEART DISEASE (ASHD): ICD-10-CM

## 2024-12-19 DIAGNOSIS — I50.42 CHRONIC COMBINED SYSTOLIC AND DIASTOLIC HEART FAILURE: Primary | ICD-10-CM

## 2024-12-19 DIAGNOSIS — E78.5 DYSLIPIDEMIA: ICD-10-CM

## 2024-12-19 DIAGNOSIS — I10 PRIMARY HYPERTENSION: ICD-10-CM

## 2024-12-19 PROCEDURE — 1036F TOBACCO NON-USER: CPT | Performed by: INTERNAL MEDICINE

## 2024-12-19 PROCEDURE — 3075F SYST BP GE 130 - 139MM HG: CPT | Performed by: INTERNAL MEDICINE

## 2024-12-19 PROCEDURE — 99214 OFFICE O/P EST MOD 30 MIN: CPT | Performed by: INTERNAL MEDICINE

## 2024-12-19 PROCEDURE — 3008F BODY MASS INDEX DOCD: CPT | Performed by: INTERNAL MEDICINE

## 2024-12-19 PROCEDURE — 3078F DIAST BP <80 MM HG: CPT | Performed by: INTERNAL MEDICINE

## 2024-12-19 RX ORDER — METOPROLOL SUCCINATE 25 MG/1
12.5 TABLET, EXTENDED RELEASE ORAL DAILY
Qty: 45 TABLET | Refills: 3 | Status: SHIPPED | OUTPATIENT
Start: 2024-12-19 | End: 2025-12-19

## 2024-12-19 ASSESSMENT — ENCOUNTER SYMPTOMS
GASTROINTESTINAL NEGATIVE: 1
HEMATOLOGIC/LYMPHATIC NEGATIVE: 1
EYES NEGATIVE: 1
NEUROLOGICAL NEGATIVE: 1
DEPRESSION: 0
PSYCHIATRIC NEGATIVE: 1
CONSTITUTIONAL NEGATIVE: 1
CARDIOVASCULAR NEGATIVE: 1
RESPIRATORY NEGATIVE: 1
LOSS OF SENSATION IN FEET: 0
ALLERGIC/IMMUNOLOGIC NEGATIVE: 1
OCCASIONAL FEELINGS OF UNSTEADINESS: 0
MUSCULOSKELETAL NEGATIVE: 1
ENDOCRINE NEGATIVE: 1

## 2024-12-19 ASSESSMENT — COLUMBIA-SUICIDE SEVERITY RATING SCALE - C-SSRS
6. HAVE YOU EVER DONE ANYTHING, STARTED TO DO ANYTHING, OR PREPARED TO DO ANYTHING TO END YOUR LIFE?: NO
2. HAVE YOU ACTUALLY HAD ANY THOUGHTS OF KILLING YOURSELF?: NO
1. IN THE PAST MONTH, HAVE YOU WISHED YOU WERE DEAD OR WISHED YOU COULD GO TO SLEEP AND NOT WAKE UP?: NO

## 2024-12-19 ASSESSMENT — PAIN SCALES - GENERAL: PAINLEVEL_OUTOF10: 0-NO PAIN

## 2024-12-19 NOTE — PROGRESS NOTES
Preventive Cardiology Clinic Note    Reason for Visit: Follow-up visit  Referring Clinician: No ref. provider found     History of Present Illness: Mr. Bell is a 64-year-old gentleman with coronary artery disease status post ST elevation myocardial infarction in 2010 with PCI to the LAD and subsequently the RCA, chronic systolic and diastolic heart failure with LVEF 40 to 45%, hypertension, and dyslipidemia who is here for follow-up visit.  Since his last visit he has been doing well without any intercurrent major health events or hospitalizations.  He does not report any exertional chest pain, shortness of breath, palpitations, syncope, orthopnea, paroxysmal nocturnal dyspnea, or lower extremity swelling.  He has noticed that his heart rate has been low at times when resting in the 30s to 40s.  He is on 25 mg of metoprolol XL currently as well as losartan, low-dose aspirin, and high intensity statin.  He did not obtain the SGLT2 inhibitor due to high cost.  His blood pressure has been in the normal range in the ambulatory setting.    Prior CV history:  In 2010 he had a cardiac arrest while playing tennis. He had immediate bystander CPR and successful defibrillation. Coronary angiography revealed an occluded LAD and disease in the RCA. He underwent primary PCI to his proximal LAD and subsequent staged PCI to his mid and distal RCA 2 weeks later.     Past Medical History:  He has a past medical history of Ear problems, HL (hearing loss), Loose body in knee, unspecified knee (04/30/2021), Old myocardial infarction (09/30/2013), Pain in left knee (04/12/2021), Pain in right knee (04/12/2021), Pain in unspecified knee (04/30/2021), and Sleep apnea.    Past Surgical History:  He has a past surgical history that includes Other surgical history (05/04/2021) and Other surgical history (05/04/2021).    Social History:  He reports that he quit smoking about 14 years ago. His smoking use included cigarettes. He started  "smoking about 24 years ago. He has a 2.5 pack-year smoking history. He has been exposed to tobacco smoke. He has never used smokeless tobacco. He reports current alcohol use. He reports that he does not use drugs.    Family History:  Family History   Problem Relation Name Age of Onset    Hypertension Mother      Coronary artery disease Father Justin Bell     Other (cabg) Father Justin Bell     Heart attack Father Justin Bell     Heart failure Father Justin Bell     Other (acute myeloid leukemia) Brother Mingo Bell     Cancer Brother Mingo Bell        Allergies:  Lisinopril    Outpatient Medications:  Current Outpatient Medications   Medication Instructions    aspirin 81 mg EC tablet 1 tablet, Daily    atorvastatin (LIPITOR) 40 mg, oral, Daily    cholecalciferol, vitD3,/vit K2 (vitamin D3-vitamin K2) 125-90 mcg capsule Daily    metoprolol succinate XL (TOPROL-XL) 25 mg, oral, Daily    valsartan (DIOVAN) 20 mg, oral, Daily       Review of Systems:  Review of Systems   Constitutional: Negative.   HENT: Negative.     Eyes: Negative.    Cardiovascular: Negative.    Respiratory: Negative.     Endocrine: Negative.    Hematologic/Lymphatic: Negative.    Skin: Negative.    Musculoskeletal: Negative.    Gastrointestinal: Negative.    Genitourinary: Negative.    Neurological: Negative.    Psychiatric/Behavioral: Negative.     Allergic/Immunologic: Negative.        Last Recorded Vitals:  Vitals:    12/19/24 1138   BP: 135/79   BP Location: Left arm   Patient Position: Sitting   Pulse: 50   SpO2: 98%   Weight: 60.8 kg (134 lb)   Height: 1.575 m (5' 2\")       Physical Examination:  General: Well appearing, well-nourished, in no acute distress.  HEENT: Normocephalic atraumatic, pupils equal and reactive to light, extraocular muscles intact, no conjunctival injection, oropharynx clear without exudates.  Neck: Normal carotid arterial pulses, no arterial bruits, no " "thyromegaly.  Cardiac: Regular rhythm and normal heart rate.  S1, S2 present and normal.  No murmurs, rubs, or gallops.  PMI is nondisplaced. Jugular venous pulsations are normal.  Pulmonary: Normal breath sounds, no increased work of breathing, no wheezes or crackles.  GI: Normal bowel sounds, abdominal aorta not enlarged, no hepatosplenomegaly, no abdominal bruits.  Lower extremities: No cyanosis, clubbing, or edema.  No xanthelasma present. Normal distal pulses.  Skin: Skin intact. No significant rashes or lesions present.  Neuro: Alert and oriented x 3, normal attention and cognition, no focal motor or sensory neurologic deficits.  Psych: Normal affect and mood.  Musculoskeletal: Normal gait normal muscle tone.    Laboratory Studies:  Lab Results   Component Value Date    GLUCOSE 86 08/05/2024    CALCIUM 9.3 08/05/2024     08/05/2024    K 4.5 08/05/2024    CO2 29 08/05/2024     08/05/2024    BUN 18 08/05/2024    CREATININE 0.89 08/05/2024     Lab Results   Component Value Date    ALT 19 08/05/2024    AST 25 08/05/2022    ALKPHOS 46 08/05/2022    BILITOT 1.0 08/05/2022         Lab Results   Component Value Date    CHOL 124 08/05/2024    CHOL 115 08/25/2023    CHOL 115 08/05/2022     Lab Results   Component Value Date    HDL 37.6 08/05/2024    HDL 32.7 (A) 08/25/2023    HDL 30.5 (A) 08/05/2022     Lab Results   Component Value Date    LDLCALC 73 08/05/2024     Lab Results   Component Value Date    TRIG 69 08/05/2024    TRIG 98 08/25/2023    TRIG 77 08/05/2022     No components found for: \"CHOLHDL\"  Lab Results   Component Value Date    HGBA1C 5.8 (H) 08/05/2024     Lp(a) 104.6 nmol/L (elevated)  Hs-CRP 1.5 (average)    Assessment and Plan:  Problem List Items Addressed This Visit          Cardiac and Vasculature    Chronic combined systolic and diastolic heart failure - Primary    Relevant Orders    Transthoracic Echo Complete    Arteriosclerotic heart disease (ASHD)    Dyslipidemia    Hypertension "     Mr. Bell is a 64-year-old gentleman with coronary artery disease status post ST elevation myocardial infarction in 2010 with PCI to the LAD and subsequently the RCA, chronic systolic and diastolic heart failure with LVEF 40 to 45%, hypertension, and dyslipidemia who is here for follow-up visit.  He is currently on appropriate secondary prevention medical therapy for atherosclerotic cardiovascular disease and chronic heart failure.  He was unable to afford the SGLT2 inhibitor.  We could consider the addition of an MRA in the future although I would like to first reassess his LVEF so I have ordered a repeat echocardiogram.  Regard to his sinus bradycardia I will decrease the dose of his metoprolol in half to 12.5 mg of metoprolol XL daily.  His blood cholesterol is borderline controlled with goal less than 70 mg/dL so we will recheck this at the next visit and increase the dose of the statin if needed.  I will see him again in 1 year here in the office for routine follow-up.  Please do not hesitate to contact me with any questions or concerns.    Moris Phelan MD, ROBIN, FACC  Director,  [facility]  Director,  [facility] Program  Associate Professor of Medicine  [employer]

## 2025-01-13 ENCOUNTER — HOSPITAL ENCOUNTER (OUTPATIENT)
Dept: CARDIOLOGY | Facility: CLINIC | Age: 65
Discharge: HOME | End: 2025-01-13
Payer: COMMERCIAL

## 2025-01-13 DIAGNOSIS — I25.5 ISCHEMIC CARDIOMYOPATHY: ICD-10-CM

## 2025-01-13 DIAGNOSIS — I50.42 CHRONIC COMBINED SYSTOLIC AND DIASTOLIC HEART FAILURE: ICD-10-CM

## 2025-01-13 PROCEDURE — C8929 TTE W OR WO FOL WCON,DOPPLER: HCPCS

## 2025-01-13 PROCEDURE — 2500000004 HC RX 250 GENERAL PHARMACY W/ HCPCS (ALT 636 FOR OP/ED): Performed by: INTERNAL MEDICINE

## 2025-01-13 PROCEDURE — 93306 TTE W/DOPPLER COMPLETE: CPT | Performed by: INTERNAL MEDICINE

## 2025-01-13 RX ADMIN — PERFLUTREN 2 ML OF DILUTION: 6.52 INJECTION, SUSPENSION INTRAVENOUS at 15:44

## 2025-01-14 ENCOUNTER — PATIENT MESSAGE (OUTPATIENT)
Dept: CARDIOLOGY | Facility: CLINIC | Age: 65
End: 2025-01-14
Payer: COMMERCIAL

## 2025-01-14 DIAGNOSIS — I50.42 CHRONIC COMBINED SYSTOLIC AND DIASTOLIC HEART FAILURE: Primary | ICD-10-CM

## 2025-01-14 LAB
AORTIC VALVE PEAK VELOCITY: 1.47 M/S
AV PEAK GRADIENT: 9 MMHG
AVA (PEAK VEL): 1.8 CM2
EJECTION FRACTION APICAL 4 CHAMBER: 44.6
EJECTION FRACTION: 33 %
LEFT ATRIUM VOLUME AREA LENGTH INDEX BSA: 42.1 ML/M2
LEFT VENTRICLE INTERNAL DIMENSION DIASTOLE: 5.46 CM (ref 3.5–6)
LEFT VENTRICULAR OUTFLOW TRACT DIAMETER: 1.79 CM
MITRAL VALVE E/A RATIO: 1.03
RIGHT VENTRICLE FREE WALL PEAK S': 9 CM/S
RIGHT VENTRICLE PEAK SYSTOLIC PRESSURE: 47.8 MMHG
TRICUSPID ANNULAR PLANE SYSTOLIC EXCURSION: 1.7 CM

## 2025-01-15 RX ORDER — SPIRONOLACTONE 25 MG/1
25 TABLET ORAL DAILY
Qty: 90 TABLET | Refills: 3 | Status: SHIPPED | OUTPATIENT
Start: 2025-01-15 | End: 2026-01-15

## 2025-02-12 ENCOUNTER — APPOINTMENT (OUTPATIENT)
Dept: OTOLARYNGOLOGY | Facility: CLINIC | Age: 65
End: 2025-02-12
Payer: COMMERCIAL

## 2025-02-12 ENCOUNTER — APPOINTMENT (OUTPATIENT)
Dept: AUDIOLOGY | Facility: CLINIC | Age: 65
End: 2025-02-12
Payer: COMMERCIAL

## 2025-02-12 VITALS — HEIGHT: 62 IN | WEIGHT: 134 LBS | BODY MASS INDEX: 24.66 KG/M2

## 2025-02-12 DIAGNOSIS — H93.12 TINNITUS OF LEFT EAR: ICD-10-CM

## 2025-02-12 DIAGNOSIS — H91.20 SUDDEN-ONSET SENSORINEURAL HEARING LOSS: ICD-10-CM

## 2025-02-12 DIAGNOSIS — H90.3 ASYMMETRICAL SENSORINEURAL HEARING LOSS: Primary | ICD-10-CM

## 2025-02-12 PROCEDURE — 3008F BODY MASS INDEX DOCD: CPT | Performed by: STUDENT IN AN ORGANIZED HEALTH CARE EDUCATION/TRAINING PROGRAM

## 2025-02-12 PROCEDURE — 1036F TOBACCO NON-USER: CPT | Performed by: STUDENT IN AN ORGANIZED HEALTH CARE EDUCATION/TRAINING PROGRAM

## 2025-02-12 PROCEDURE — 99214 OFFICE O/P EST MOD 30 MIN: CPT | Performed by: STUDENT IN AN ORGANIZED HEALTH CARE EDUCATION/TRAINING PROGRAM

## 2025-02-12 PROCEDURE — 92557 COMPREHENSIVE HEARING TEST: CPT | Performed by: AUDIOLOGIST

## 2025-02-12 PROCEDURE — 92550 TYMPANOMETRY & REFLEX THRESH: CPT | Performed by: AUDIOLOGIST

## 2025-02-12 NOTE — PROGRESS NOTES
HPI  2/12/25  The patient present for follow-up, reports stable left hearing loss.  No other otologic complaints.  Recall 8/22/24  Gui Bell is a 64 y.o. male presenting for initial evaluation of sudden left hearing loss.  The patient reports developing left sudden hearing loss with associated dizziness on 7/28/24, he was evaluated by his PCP (Dr. Benavides) and prescribed systemic steroids (prednisone).  He was then evaluated with Dr. Brown who perform a left transtympanic dexamethasone injection.  The patient reports improvement but persistent left hearing loss and left high-frequency tinnitus which he describes as his hissing.  Dizziness/vertigo has resolved.  Denies ear pain, vertigo, discharge from the ears, aural fullness, autophony.  No history of previous ear surgeries.     Past Medical History:   Diagnosis Date    Ear problems     HL (hearing loss)     Loose body in knee, unspecified knee 04/30/2021    Loose body in knee    Old myocardial infarction 09/30/2013    Past myocardial infarction    Pain in left knee 04/12/2021    Left knee pain    Pain in right knee 04/12/2021    Right knee pain    Pain in unspecified knee 04/30/2021    Chronic knee pain, unspecified laterality    Sleep apnea        Past Surgical History:   Procedure Laterality Date    OTHER SURGICAL HISTORY  05/04/2021    Tonsillectomy    OTHER SURGICAL HISTORY  05/04/2021    Cardiac catheterization with stent placement         Current Outpatient Medications on File Prior to Visit   Medication Sig Dispense Refill    aspirin 81 mg EC tablet Take 1 tablet (81 mg) by mouth once daily.      atorvastatin (Lipitor) 40 mg tablet TAKE 1 TABLET BY MOUTH EVERY DAY 90 tablet 4    cholecalciferol, vitD3,/vit K2 (vitamin D3-vitamin K2) 125-90 mcg capsule Take by mouth early in the morning..      metoprolol succinate XL (Toprol-XL) 25 mg 24 hr tablet Take 0.5 tablets (12.5 mg) by mouth once daily. Do not crush or chew. 45 tablet 3    spironolactone  (Aldactone) 25 mg tablet Take 1 tablet (25 mg) by mouth once daily. 90 tablet 3    valsartan (Diovan) 40 mg tablet TAKE 1/2 TABLET BY MOUTH DAILY 45 tablet 3     No current facility-administered medications on file prior to visit.        Allergies   Allergen Reactions    Lisinopril Rash        Review of Systems  A detailed 12 point ROS was performed and is negative except as noted in the intake form, HPI and/or Past Medical History        Physical Exam   CONSTITUTIONAL: Well developed, well nourished.  VOICE: Normal voice quality  RESPIRATION: Breathing comfortably, no stridor.  CV: No clubbing/cyanosis/edema in hands.  EYES: EOM Intact, sclera normal.  NEURO: Alert and oriented times 3, Cranial nerves V,VII intact and symmetric bilaterally.  HEAD AND FACE: Symmetric facial features, no masses or lesions, sinuses nontender to palpation.  SALIVARY GLANDS: Parotid and submandibular glands normal bilaterally.  EARS: Normal external ears, external auditory canals, and TMs to otoscopy  NOSE: External nose midline, anterior rhinoscopy is normal with limited visualization to the anterior aspect of the interior turbinates. No lesions noted.  ORAL CAVITY/OROPHARYNX/LIPS: Normal mucous membranes, normal floor of mouth/tongue/OP, no masses or lesions are noted.  PHARYNGEAL WALLS AND NASOPHARYNX: No masses noted. Mucosa appears clean and moist  NECK/LYMPH: No LAD, no thyroid masses. Trachea palpably midline  SKIN: Neck skin is without injury  PSYCH: Alert and oriented with appropriate mood and affect     Results:   - MRI IAC 8/21/2024 personally reviewed showing no CPA/IAC lesions or masses.   - Audiogram 8/22/24 personally reviewed  Right: Normal hearing up to 3000 Hz sloping to moderate sensorineural hearing loss at 4000 Hz improving to mild sensorineural hearing loss at 8000 Hz.  Speech discrimination score 100%.  Type A tympanogram.  Left: Normal sloping to profound sensorineural hearing loss.  Speech discrimination score  0%.  Type A tympanogram.  -Audiogram 2/12/2025 personally reviewed  Right: Mild/moderate sensorineural hearing loss at 4000 Hz, otherwise normal hearing.  Speech discrimination score 100%.  Type A tympanogram.  Left: Mild sloping to profound sensorineural hearing loss.  Speech discrimination score 4%.  Type A tympanogram.      Assessment  Left sudden sensorineural hearing loss  Asymmetric sensorineural hearing loss    Plan  63-year-old male presenting for evaluation of left sudden hearing loss s/p systemic steroids and middle ear dexamethasone injections with no significant improvement of his hearing.   MRI IAC 8/21/2024: No CPA/IAC masses.  Audiogram performed today notable for asymmetric sensorineural hearing loss with excellent speech discrimination on the right and very poor speech discrimination on the left.  Multiple treatment alternatives discussed including CROS HA, BAHA and CI, at this time the patient would like to proceed with observation.  We will monitor his hearing with yearly audiograms  RTC 1y or sooner as needed.

## 2025-02-12 NOTE — PROGRESS NOTES
"AUDIOMETRIC EVALUATION       Name:  Gui Bell  :  1960  Age:  64 y.o.  Date of Evaluation:  2025     HISTORY  Gui Bell was seen today for a hearing evaluation due to sudden hearing loss in the left ear in Summer 2024. Has had 3 intratympanic steroid injects, feels hearing is somewhat better but not at baseline. Reports constant humming tinnitus in the left ear.     Denies aural pain, drainage, fullness, history of familial hearing loss or noise exposure.    PROCEDURE:  Otoscopic Evaluation:    RIGHT: Clear ear canal and tympanic membrane visualized.  LEFT:  Clear ear canal and tympanic membrane visualized.    Immittance: Tympanometry (226 Hz probe tone) and Stapedial Acoustic Reflexes Thresholds (ART)(Probe ear):  RIGHT: Normal middle ear pressure, mobility, and ear canal volume. Ipsilateral ART present 500-2000 Hz.  LEFT: Normal middle ear pressure, mobility, and ear canal volume. Ipsilateral ART absent 500-2000 Hz.    Pure Tone and Speech Audiometry:    Test Technique: Pure Tone Audiometry via insert earphones  Test Reliability: good    RIGHT: Normal hearing through 2000 Hz sloping to moderate  sensorineural hearing loss through 8000 Hz. Word Recognition score was excellent using recorded material (NU-6 10-word list ordered by difficulty).   LEFT: Normal hearing through 250 Hz sloping to profound  sensorineural hearing loss through 8000 Hz. Word Recognition score was very poor using recorded material (NU-6 25-word list).     EVALUATION  See scanned Audiogram in \"Media\".    IMPRESSIONS:  Today's test results indicate stable hearing as compared to previous testing (2024).    RECOMMENDATIONS:  Continue medical follow-up with physician.  Return for audiologic assessment in conjunction with otologic care or annually.   Implement communication strategies (utilizing visual cues/gestures; reducing background noise and distance from desired source; increasing light to assist with visual " cues; use of clear speech).   Consider CRYSTAL, MIESHA or CI evaluation    ANDIE Tate, CCC-A  Senior Clinical Audiologist    TIME: 135-155

## 2025-03-22 LAB
ANION GAP SERPL CALCULATED.4IONS-SCNC: 9 MMOL/L (CALC) (ref 7–17)
BUN SERPL-MCNC: 14 MG/DL (ref 7–25)
BUN/CREAT SERPL: NORMAL (CALC) (ref 6–22)
CALCIUM SERPL-MCNC: 9.5 MG/DL (ref 8.6–10.3)
CHLORIDE SERPL-SCNC: 105 MMOL/L (ref 98–110)
CO2 SERPL-SCNC: 26 MMOL/L (ref 20–32)
CREAT SERPL-MCNC: 0.86 MG/DL (ref 0.7–1.35)
EGFRCR SERPLBLD CKD-EPI 2021: 97 ML/MIN/1.73M2
GLUCOSE SERPL-MCNC: 80 MG/DL (ref 65–99)
POTASSIUM SERPL-SCNC: 4.4 MMOL/L (ref 3.5–5.3)
SODIUM SERPL-SCNC: 140 MMOL/L (ref 135–146)

## 2025-08-22 DIAGNOSIS — I10 ESSENTIAL (PRIMARY) HYPERTENSION: ICD-10-CM

## 2025-08-22 RX ORDER — VALSARTAN 40 MG/1
20 TABLET ORAL DAILY
Qty: 45 TABLET | Refills: 3 | Status: SHIPPED | OUTPATIENT
Start: 2025-08-22